# Patient Record
Sex: FEMALE | Race: WHITE | NOT HISPANIC OR LATINO | URBAN - METROPOLITAN AREA
[De-identification: names, ages, dates, MRNs, and addresses within clinical notes are randomized per-mention and may not be internally consistent; named-entity substitution may affect disease eponyms.]

---

## 2023-04-24 ENCOUNTER — TELEPHONE (OUTPATIENT)
Dept: PRIMARY CARE | Facility: CLINIC | Age: 38
End: 2023-04-24
Payer: COMMERCIAL

## 2023-04-24 NOTE — TELEPHONE ENCOUNTER
I was working a work que and came across this patient scheduled for a NPV with Asmita wanting a well women exam with breast and pap. You might want to call patient and advise her that Asmita does not do well women/pap and see if she still wants to est with her.

## 2023-04-25 PROBLEM — E78.01 FAMILIAL HYPERCHOLESTEROLEMIA: Status: ACTIVE | Noted: 2023-04-25

## 2023-04-25 PROBLEM — J45.20 MILD INTERMITTENT ASTHMA WITHOUT COMPLICATION (HHS-HCC): Status: ACTIVE | Noted: 2023-04-25

## 2023-04-25 PROBLEM — J30.9 ALLERGIC RHINITIS: Status: ACTIVE | Noted: 2023-04-25

## 2023-04-25 RX ORDER — LEVALBUTEROL TARTRATE 45 UG/1
AEROSOL, METERED ORAL
COMMUNITY
Start: 2015-03-25 | End: 2023-05-03 | Stop reason: SDUPTHER

## 2023-04-25 RX ORDER — FOLIC ACID/MULTIVIT,IRON,MINER 0.4MG-18MG
TABLET ORAL
COMMUNITY
Start: 2018-06-01 | End: 2023-04-27

## 2023-04-25 RX ORDER — CETIRIZINE HYDROCHLORIDE, PSEUDOEPHEDRINE HYDROCHLORIDE 5; 120 MG/1; MG/1
TABLET, FILM COATED, EXTENDED RELEASE ORAL
COMMUNITY
Start: 2015-03-25 | End: 2023-04-27

## 2023-05-02 PROBLEM — E66.01 MORBID OBESITY (MULTI): Status: ACTIVE | Noted: 2023-05-02

## 2023-05-02 PROBLEM — L92.3: Status: ACTIVE | Noted: 2023-05-02

## 2023-05-02 PROBLEM — J45.909 ASTHMA (HHS-HCC): Status: RESOLVED | Noted: 2023-05-02 | Resolved: 2023-05-02

## 2023-05-02 PROBLEM — E34.8 OTHER SPECIFIED ENDOCRINE DISORDERS: Status: RESOLVED | Noted: 2023-05-02 | Resolved: 2023-05-02

## 2023-05-02 PROBLEM — R71.8 ELEVATED RED BLOOD CELL COUNT: Status: RESOLVED | Noted: 2023-05-02 | Resolved: 2023-05-02

## 2023-05-02 PROBLEM — R06.00 DYSPNEA: Status: RESOLVED | Noted: 2023-05-02 | Resolved: 2023-05-02

## 2023-05-02 PROBLEM — R73.03 PREDIABETES: Status: RESOLVED | Noted: 2023-05-02 | Resolved: 2023-05-02

## 2023-05-02 PROBLEM — E88.810 METABOLIC SYNDROME: Status: ACTIVE | Noted: 2023-05-02

## 2023-05-02 PROBLEM — R06.00 DYSPNEA: Status: ACTIVE | Noted: 2023-05-02

## 2023-05-02 PROBLEM — R40.0 DAYTIME SOMNOLENCE: Status: RESOLVED | Noted: 2023-05-02 | Resolved: 2023-05-02

## 2023-05-02 PROBLEM — E34.8 OTHER SPECIFIED ENDOCRINE DISORDERS: Status: ACTIVE | Noted: 2023-05-02

## 2023-05-02 PROBLEM — E11.9 DIABETES MELLITUS WITHOUT COMPLICATION (MULTI): Status: RESOLVED | Noted: 2023-05-02 | Resolved: 2023-05-02

## 2023-05-02 PROBLEM — E88.810 METABOLIC SYNDROME: Status: RESOLVED | Noted: 2023-05-02 | Resolved: 2023-05-02

## 2023-05-02 PROBLEM — E66.01 MORBID OBESITY (MULTI): Status: RESOLVED | Noted: 2023-05-02 | Resolved: 2023-05-02

## 2023-05-02 PROBLEM — E28.2 POLYCYSTIC OVARY SYNDROME: Status: RESOLVED | Noted: 2023-05-02 | Resolved: 2023-05-02

## 2023-05-02 PROBLEM — J45.909 ASTHMA (HHS-HCC): Status: ACTIVE | Noted: 2023-05-02

## 2023-05-02 PROBLEM — K13.70 ORAL LESION: Status: RESOLVED | Noted: 2023-05-02 | Resolved: 2023-05-02

## 2023-05-02 PROBLEM — D58.2 OTHER HEMOGLOBINOPATHIES (CMS-HCC): Status: RESOLVED | Noted: 2023-05-02 | Resolved: 2023-05-02

## 2023-05-02 PROBLEM — J45.20 MILD INTERMITTENT ASTHMA WITHOUT COMPLICATION (HHS-HCC): Status: RESOLVED | Noted: 2023-04-25 | Resolved: 2023-05-02

## 2023-05-02 PROBLEM — J40: Status: ACTIVE | Noted: 2023-05-02

## 2023-05-02 PROBLEM — E63.9 NUTRITIONAL DEFICIENCY: Status: ACTIVE | Noted: 2023-05-02

## 2023-05-02 PROBLEM — R73.03 PREDIABETES: Status: ACTIVE | Noted: 2023-05-02

## 2023-05-02 PROBLEM — R06.83 HABITUAL SNORING: Status: ACTIVE | Noted: 2023-05-02

## 2023-05-02 PROBLEM — K22.9 ESOPHAGEAL DISORDER: Status: ACTIVE | Noted: 2023-05-02

## 2023-05-02 PROBLEM — E63.9 NUTRITIONAL DEFICIENCY: Status: RESOLVED | Noted: 2023-05-02 | Resolved: 2023-05-02

## 2023-05-02 PROBLEM — R40.0 DAYTIME SOMNOLENCE: Status: ACTIVE | Noted: 2023-05-02

## 2023-05-02 PROBLEM — J35.01 CHRONIC TONSILLITIS: Status: RESOLVED | Noted: 2023-05-02 | Resolved: 2023-05-02

## 2023-05-02 PROBLEM — E28.2 POLYCYSTIC OVARY SYNDROME: Status: ACTIVE | Noted: 2023-05-02

## 2023-05-02 PROBLEM — D58.2 OTHER HEMOGLOBINOPATHIES (CMS-HCC): Status: ACTIVE | Noted: 2023-05-02

## 2023-05-02 PROBLEM — K13.70 ORAL LESION: Status: ACTIVE | Noted: 2023-05-02

## 2023-05-02 PROBLEM — K22.9 ESOPHAGEAL DISORDER: Status: RESOLVED | Noted: 2023-05-02 | Resolved: 2023-05-02

## 2023-05-02 PROBLEM — E11.9 DIABETES MELLITUS WITHOUT COMPLICATION (MULTI): Status: ACTIVE | Noted: 2023-05-02

## 2023-05-02 PROBLEM — R71.8 ELEVATED RED BLOOD CELL COUNT: Status: ACTIVE | Noted: 2023-05-02

## 2023-05-02 PROBLEM — J35.01 CHRONIC TONSILLITIS: Status: ACTIVE | Noted: 2023-05-02

## 2023-05-02 PROBLEM — R06.83 HABITUAL SNORING: Status: RESOLVED | Noted: 2023-05-02 | Resolved: 2023-05-02

## 2023-05-02 PROBLEM — E78.01 FAMILIAL HYPERCHOLESTEROLEMIA: Status: RESOLVED | Noted: 2023-04-25 | Resolved: 2023-05-02

## 2023-05-03 ENCOUNTER — OFFICE VISIT (OUTPATIENT)
Dept: PRIMARY CARE | Facility: CLINIC | Age: 38
End: 2023-05-03
Payer: COMMERCIAL

## 2023-05-03 ENCOUNTER — LAB (OUTPATIENT)
Dept: LAB | Facility: LAB | Age: 38
End: 2023-05-03
Payer: COMMERCIAL

## 2023-05-03 VITALS
SYSTOLIC BLOOD PRESSURE: 106 MMHG | HEART RATE: 67 BPM | DIASTOLIC BLOOD PRESSURE: 72 MMHG | OXYGEN SATURATION: 99 % | WEIGHT: 156.6 LBS | HEIGHT: 68 IN | BODY MASS INDEX: 23.73 KG/M2

## 2023-05-03 DIAGNOSIS — E78.5 HYPERLIPIDEMIA, UNSPECIFIED HYPERLIPIDEMIA TYPE: ICD-10-CM

## 2023-05-03 DIAGNOSIS — J45.20 MILD INTERMITTENT ASTHMA, UNSPECIFIED WHETHER COMPLICATED (HHS-HCC): ICD-10-CM

## 2023-05-03 DIAGNOSIS — Z01.419 ENCOUNTER FOR WELL WOMAN EXAM WITH ROUTINE GYNECOLOGICAL EXAM: ICD-10-CM

## 2023-05-03 DIAGNOSIS — Z00.00 WELLNESS EXAMINATION: ICD-10-CM

## 2023-05-03 DIAGNOSIS — Z00.00 WELLNESS EXAMINATION: Primary | ICD-10-CM

## 2023-05-03 DIAGNOSIS — Z13.29 SCREENING FOR THYROID DISORDER: ICD-10-CM

## 2023-05-03 DIAGNOSIS — J30.9 ALLERGIC RHINITIS, UNSPECIFIED SEASONALITY, UNSPECIFIED TRIGGER: ICD-10-CM

## 2023-05-03 LAB
ALANINE AMINOTRANSFERASE (SGPT) (U/L) IN SER/PLAS: 10 U/L (ref 7–45)
ALBUMIN (G/DL) IN SER/PLAS: 4.1 G/DL (ref 3.4–5)
ALBUMIN (MG/L) IN URINE: <7 MG/L
ALBUMIN/CREATININE (UG/MG) IN URINE: NORMAL UG/MG CRT (ref 0–30)
ALKALINE PHOSPHATASE (U/L) IN SER/PLAS: 39 U/L (ref 33–110)
ANION GAP IN SER/PLAS: 12 MMOL/L (ref 10–20)
ASPARTATE AMINOTRANSFERASE (SGOT) (U/L) IN SER/PLAS: 15 U/L (ref 9–39)
BASOPHILS (10*3/UL) IN BLOOD BY AUTOMATED COUNT: 0.07 X10E9/L (ref 0–0.1)
BASOPHILS/100 LEUKOCYTES IN BLOOD BY AUTOMATED COUNT: 1.6 % (ref 0–2)
BILIRUBIN TOTAL (MG/DL) IN SER/PLAS: 0.4 MG/DL (ref 0–1.2)
CALCIUM (MG/DL) IN SER/PLAS: 9 MG/DL (ref 8.6–10.6)
CARBON DIOXIDE, TOTAL (MMOL/L) IN SER/PLAS: 28 MMOL/L (ref 21–32)
CHLORIDE (MMOL/L) IN SER/PLAS: 103 MMOL/L (ref 98–107)
CHOLESTEROL (MG/DL) IN SER/PLAS: 266 MG/DL (ref 0–199)
CHOLESTEROL IN HDL (MG/DL) IN SER/PLAS: 83.4 MG/DL
CHOLESTEROL/HDL RATIO: 3.2
CREATININE (MG/DL) IN SER/PLAS: 0.92 MG/DL (ref 0.5–1.05)
CREATININE (MG/DL) IN URINE: 27.3 MG/DL (ref 20–320)
EOSINOPHILS (10*3/UL) IN BLOOD BY AUTOMATED COUNT: 0.34 X10E9/L (ref 0–0.7)
EOSINOPHILS/100 LEUKOCYTES IN BLOOD BY AUTOMATED COUNT: 7.9 % (ref 0–6)
ERYTHROCYTE DISTRIBUTION WIDTH (RATIO) BY AUTOMATED COUNT: 12.4 % (ref 11.5–14.5)
ERYTHROCYTE MEAN CORPUSCULAR HEMOGLOBIN CONCENTRATION (G/DL) BY AUTOMATED: 32.5 G/DL (ref 32–36)
ERYTHROCYTE MEAN CORPUSCULAR VOLUME (FL) BY AUTOMATED COUNT: 94 FL (ref 80–100)
ERYTHROCYTES (10*6/UL) IN BLOOD BY AUTOMATED COUNT: 4.29 X10E12/L (ref 4–5.2)
GFR FEMALE: 83 ML/MIN/1.73M2
GLUCOSE (MG/DL) IN SER/PLAS: 91 MG/DL (ref 74–99)
HEMATOCRIT (%) IN BLOOD BY AUTOMATED COUNT: 40.3 % (ref 36–46)
HEMOGLOBIN (G/DL) IN BLOOD: 13.1 G/DL (ref 12–16)
IMMATURE GRANULOCYTES/100 LEUKOCYTES IN BLOOD BY AUTOMATED COUNT: 0 % (ref 0–0.9)
LDL: 168 MG/DL (ref 0–99)
LEUKOCYTES (10*3/UL) IN BLOOD BY AUTOMATED COUNT: 4.3 X10E9/L (ref 4.4–11.3)
LYMPHOCYTES (10*3/UL) IN BLOOD BY AUTOMATED COUNT: 1.38 X10E9/L (ref 1.2–4.8)
LYMPHOCYTES/100 LEUKOCYTES IN BLOOD BY AUTOMATED COUNT: 32.2 % (ref 13–44)
MONOCYTES (10*3/UL) IN BLOOD BY AUTOMATED COUNT: 0.24 X10E9/L (ref 0.1–1)
MONOCYTES/100 LEUKOCYTES IN BLOOD BY AUTOMATED COUNT: 5.6 % (ref 2–10)
NEUTROPHILS (10*3/UL) IN BLOOD BY AUTOMATED COUNT: 2.26 X10E9/L (ref 1.2–7.7)
NEUTROPHILS/100 LEUKOCYTES IN BLOOD BY AUTOMATED COUNT: 52.7 % (ref 40–80)
NRBC (PER 100 WBCS) BY AUTOMATED COUNT: 0 /100 WBC (ref 0–0)
PLATELETS (10*3/UL) IN BLOOD AUTOMATED COUNT: 238 X10E9/L (ref 150–450)
POTASSIUM (MMOL/L) IN SER/PLAS: 4.1 MMOL/L (ref 3.5–5.3)
PROTEIN TOTAL: 6.8 G/DL (ref 6.4–8.2)
SODIUM (MMOL/L) IN SER/PLAS: 139 MMOL/L (ref 136–145)
THYROTROPIN (MIU/L) IN SER/PLAS BY DETECTION LIMIT <= 0.05 MIU/L: 0.97 MIU/L (ref 0.44–3.98)
TRIGLYCERIDE (MG/DL) IN SER/PLAS: 75 MG/DL (ref 0–149)
UREA NITROGEN (MG/DL) IN SER/PLAS: 11 MG/DL (ref 6–23)
VLDL: 15 MG/DL (ref 0–40)

## 2023-05-03 PROCEDURE — 99395 PREV VISIT EST AGE 18-39: CPT | Performed by: NURSE PRACTITIONER

## 2023-05-03 PROCEDURE — 36415 COLL VENOUS BLD VENIPUNCTURE: CPT

## 2023-05-03 PROCEDURE — 85025 COMPLETE CBC W/AUTO DIFF WBC: CPT

## 2023-05-03 PROCEDURE — 84443 ASSAY THYROID STIM HORMONE: CPT

## 2023-05-03 PROCEDURE — 80053 COMPREHEN METABOLIC PANEL: CPT

## 2023-05-03 PROCEDURE — 82570 ASSAY OF URINE CREATININE: CPT

## 2023-05-03 PROCEDURE — 80061 LIPID PANEL: CPT

## 2023-05-03 PROCEDURE — 82043 UR ALBUMIN QUANTITATIVE: CPT

## 2023-05-03 PROCEDURE — 1036F TOBACCO NON-USER: CPT | Performed by: NURSE PRACTITIONER

## 2023-05-03 RX ORDER — LEVALBUTEROL TARTRATE 45 UG/1
2 AEROSOL, METERED ORAL EVERY 4 HOURS PRN
Qty: 15 G | Refills: 11 | Status: SHIPPED | OUTPATIENT
Start: 2023-05-03 | End: 2024-05-01 | Stop reason: SDUPTHER

## 2023-05-03 RX ORDER — CETIRIZINE HYDROCHLORIDE 5 MG/1
5 TABLET ORAL DAILY
COMMUNITY

## 2023-05-03 ASSESSMENT — ENCOUNTER SYMPTOMS
SLEEP DISTURBANCE: 0
NEUROLOGICAL NEGATIVE: 1
HEMATOLOGIC/LYMPHATIC NEGATIVE: 1
MUSCULOSKELETAL NEGATIVE: 1
CARDIOVASCULAR NEGATIVE: 1
CONSTITUTIONAL NEGATIVE: 1
GASTROINTESTINAL NEGATIVE: 1

## 2023-05-03 NOTE — PROGRESS NOTES
"Subjective   Patient ID: Sulma Bains is a 38 y.o. female who presents for Establish Care (New pt presents to get established care) and Annual Exam (Yearly well exam ).    HPI   Patient here to establish with provider with physical examination. Previous provder was Dr. Fernanda Allison, 06/01/2018 [facility].  Works as NP at [employer]. Has two children (4 & 6) Runs and yoga for exercise. Typically needs inhaler with exercise. Had been using Niacin to help control lipids, but currently not using routinely   Pertinent Medical History: Two maternal aunts breast cancer (age 50 & 60) Mom did not get BRCA testing- has had normal mammograms.   Had EKG and echocardiogram completed while in college (Had panic attack) found to be normal. Declined EKG today with exam.     Current concerns: None  Chronic Concerns: Allergic Rhinitis, Asthma, DM,   Specialist:  None  Labs- most recent in EMR 2015.     Review of Systems   Constitutional: Negative.    HENT: Negative.          DDS every six months.    Eyes:         Eye exam- not yearly    Respiratory:          Inhaler once weekly.   Cardiovascular: Negative.    Gastrointestinal: Negative.    Genitourinary: Negative.         LMP 28 day - 2 weeks, duration 7 days, denies cramping or excessive bleeding.   Concerned about her right upper breast- since breast feeding youngest child believes to have changes in tissue- has been consistent, tender with pre menses- does breast exams routinely.      Musculoskeletal: Negative.    Skin: Negative.         Sunscreen user.   Allergic/Immunologic: Positive for environmental allergies.   Neurological: Negative.    Hematological: Negative.    Psychiatric/Behavioral:  Negative for sleep disturbance.        Objective   /72 (BP Location: Right arm, Patient Position: Sitting, BP Cuff Size: Adult)   Pulse 67   Ht 1.727 m (5' 8\")   Wt 71 kg (156 lb 9.6 oz)   SpO2 99%   BMI 23.81 kg/m²     Physical Exam  Vitals reviewed. "   Constitutional:       Appearance: Normal appearance.   HENT:      Right Ear: Tympanic membrane, ear canal and external ear normal.      Left Ear: Tympanic membrane, ear canal and external ear normal.      Nose: Nose normal.      Mouth/Throat:      Lips: Pink.      Mouth: Mucous membranes are moist.      Pharynx: Oropharynx is clear.   Eyes:      General: Lids are normal.      Extraocular Movements: Extraocular movements intact.      Conjunctiva/sclera: Conjunctivae normal.      Pupils: Pupils are equal, round, and reactive to light.   Neck:      Thyroid: No thyromegaly.   Cardiovascular:      Rate and Rhythm: Normal rate. Rhythm irregular.      Pulses: Normal pulses.   Pulmonary:      Effort: Pulmonary effort is normal.      Breath sounds: Normal breath sounds.   Chest:   Breasts:     Breasts are symmetrical.      Right: No tenderness.      Left: No tenderness.      Comments: Bilateral fibroglandular consistency throughout superior quadrants of breasts. More dense consistency within left breast compared to right breast, no abnormal masses or nodules palpation.    Abdominal:      General: Abdomen is flat. Bowel sounds are normal.      Palpations: Abdomen is soft.   Musculoskeletal:         General: Normal range of motion.      Cervical back: Full passive range of motion without pain, normal range of motion and neck supple.   Lymphadenopathy:      Cervical: No cervical adenopathy.      Upper Body:      Right upper body: No supraclavicular or axillary adenopathy.      Left upper body: No supraclavicular or axillary adenopathy.   Skin:     General: Skin is warm.   Neurological:      General: No focal deficit present.      Mental Status: She is alert.      Cranial Nerves: No cranial nerve deficit.   Psychiatric:         Mood and Affect: Mood normal.         Behavior: Behavior normal.         Judgment: Judgment normal.       Assessment/Plan   Health Maintenance  Labs- orders provided 05/03/2023  Influenza-  annually  Prevnar 13/20- not indicated   Shingrix- not indicated   Colonoscopy- no family history  Cervical Cancer screen - referral to GYN provided   Mammogram- not indicated, breast exam today unremarkable.   DEXA BONE Density - not indicated.   NON SMOKER    Diagnoses and all orders for this visit:  Wellness examination  -     CBC and Auto Differential; Future  -     Comprehensive Metabolic Panel; Future  -     Albumin , Urine Random; Future  Mild intermittent asthma, unspecified whether complicated  -     levalbuterol (Xopenex) 45 mcg/actuation inhaler; Inhale 2 puffs every 4 hours if needed for wheezing or shortness of breath.  Allergic rhinitis, unspecified seasonality, unspecified trigger- uses over the counter Cetirizine   Hyperlipidemia, unspecified hyperlipidemia type- 2015 lipid panel most recent in EMR- Total cholesterol 235, Triglycerides 121, HDL 96,   - had used Niacin previously (discontinued for breastfeeding )  -     Lipid Panel; Future  Screening for thyroid disorder  -     TSH with reflex to Free T4 if abnormal; Future  Encounter for well woman exam with routine gynecological exam- provided names to [facility] raegan & [facility] group.    -     Referral to Gynecology; Future     FOLLOW UP/ PLAN- yearly for physical and refills inhaler.   - labs-> communicate results

## 2023-05-03 NOTE — PATIENT INSTRUCTIONS
Goal of moderate exercise 150 minutes a week  Fasting labs, drink water-> will communicate results My Chart

## 2023-07-21 ENCOUNTER — LAB (OUTPATIENT)
Dept: LAB | Facility: LAB | Age: 38
End: 2023-07-21
Payer: COMMERCIAL

## 2023-07-21 LAB — TOBACCO SCREEN, URINE: NEGATIVE

## 2024-02-21 ENCOUNTER — OFFICE VISIT (OUTPATIENT)
Dept: OBSTETRICS AND GYNECOLOGY | Facility: CLINIC | Age: 39
End: 2024-02-21
Payer: COMMERCIAL

## 2024-02-21 VITALS
BODY MASS INDEX: 22.13 KG/M2 | DIASTOLIC BLOOD PRESSURE: 60 MMHG | WEIGHT: 146 LBS | HEIGHT: 68 IN | SYSTOLIC BLOOD PRESSURE: 104 MMHG

## 2024-02-21 DIAGNOSIS — Z80.3 FAMILY HISTORY OF BREAST CANCER: ICD-10-CM

## 2024-02-21 DIAGNOSIS — Z12.31 SCREENING MAMMOGRAM FOR BREAST CANCER: Primary | ICD-10-CM

## 2024-02-21 DIAGNOSIS — Z01.419 WOMEN'S ANNUAL ROUTINE GYNECOLOGICAL EXAMINATION: ICD-10-CM

## 2024-02-21 DIAGNOSIS — Z11.51 SCREENING FOR HUMAN PAPILLOMAVIRUS (HPV): ICD-10-CM

## 2024-02-21 DIAGNOSIS — Z12.4 PAP SMEAR FOR CERVICAL CANCER SCREENING: ICD-10-CM

## 2024-02-21 PROCEDURE — 1036F TOBACCO NON-USER: CPT | Performed by: NURSE PRACTITIONER

## 2024-02-21 PROCEDURE — 88175 CYTOPATH C/V AUTO FLUID REDO: CPT

## 2024-02-21 PROCEDURE — 99385 PREV VISIT NEW AGE 18-39: CPT | Performed by: NURSE PRACTITIONER

## 2024-02-21 PROCEDURE — 87624 HPV HI-RISK TYP POOLED RSLT: CPT

## 2024-02-21 ASSESSMENT — ENCOUNTER SYMPTOMS
MUSCULOSKELETAL NEGATIVE: 1
GASTROINTESTINAL NEGATIVE: 1
EYES NEGATIVE: 1
CARDIOVASCULAR NEGATIVE: 1
ENDOCRINE NEGATIVE: 1
RESPIRATORY NEGATIVE: 1
PSYCHIATRIC NEGATIVE: 1
NEUROLOGICAL NEGATIVE: 1
CONSTITUTIONAL NEGATIVE: 1

## 2024-02-21 NOTE — PROGRESS NOTES
Subjective   Patient ID: Sulma Bains is a 38 y.o. female who presents for New Patient Visit (States last pap smear was done at [facility] two years ago, no history of abnormal. ).  38 year old here for annual exam without complaints.  Her last pap was normal in 2022 at her last ob/gyn.  She states her periods are once a month and without concern.  She denies any history of abnormal paps.  She has a family history of breast cancer in her maternal great aunts x 3 and states her mom had genetic counseling appt and determined testing was not needed.  Her paternal grandmother passed from ovarian cancer in her 40s.          Review of Systems   Constitutional: Negative.    HENT: Negative.     Eyes: Negative.    Respiratory: Negative.     Cardiovascular: Negative.    Gastrointestinal: Negative.    Endocrine: Negative.    Genitourinary: Negative.    Musculoskeletal: Negative.    Skin: Negative.    Neurological: Negative.    Psychiatric/Behavioral: Negative.         Objective   Physical Exam  Vitals reviewed.   Constitutional:       Appearance: Normal appearance. She is well-developed.   Pulmonary:      Effort: Pulmonary effort is normal. No respiratory distress.   Chest:   Breasts:     Breasts are symmetrical.      Right: Normal. No swelling, bleeding, inverted nipple, mass, nipple discharge, skin change or tenderness.      Left: Normal. No swelling, bleeding, inverted nipple, mass, nipple discharge, skin change or tenderness.   Abdominal:      Palpations: Abdomen is soft.   Genitourinary:     General: Normal vulva.      Exam position: Lithotomy position.      Pubic Area: No rash.       Labia:         Right: No rash, tenderness, lesion or injury.         Left: No rash, tenderness, lesion or injury.       Urethra: No prolapse, urethral pain, urethral swelling or urethral lesion.      Vagina: Normal.      Cervix: Normal.      Uterus: Normal.       Adnexa: Right adnexa normal and left adnexa normal.      Rectum: Normal.       Comments: Pap obtained  Musculoskeletal:         General: Normal range of motion.   Lymphadenopathy:      Upper Body:      Right upper body: No supraclavicular, axillary or pectoral adenopathy.      Left upper body: No supraclavicular, axillary or pectoral adenopathy.   Skin:     General: Skin is warm and dry.   Neurological:      General: No focal deficit present.      Mental Status: She is alert and oriented to person, place, and time. Mental status is at baseline.   Psychiatric:         Attention and Perception: Attention and perception normal.         Mood and Affect: Mood and affect normal.         Speech: Speech normal.         Behavior: Behavior normal. Behavior is cooperative.         Thought Content: Thought content normal.         Judgment: Judgment normal.     Exam performed by Elizabeth NAVAS, 2nd exam performed by student Anel BRANDT student [facility] upon patient agreement.      Assessment/Plan   Problem List Items Addressed This Visit             ICD-10-CM    Women's annual routine gynecological examination Z01.419     Other Visit Diagnoses         Codes    Screening mammogram for breast cancer    -  Primary Z12.31    Relevant Orders    BI mammo bilateral screening tomosynthesis    Family history of breast cancer     Z80.3    Relevant Orders    BI mammo bilateral screening tomosynthesis    Pap smear for cervical cancer screening     Z12.4    Relevant Orders    THINPREP PAP TEST    Screening for human papillomavirus (HPV)     Z11.51    Relevant Orders    THINPREP PAP TEST          Pap/hpv sent  Mammogram ordered  Reviewed option of Myrisk testing, patient considering  Follow up 1 year or as needed        STACY Guardado-CNP 02/21/24 3:47 PM

## 2024-04-25 ENCOUNTER — HOSPITAL ENCOUNTER (OUTPATIENT)
Dept: RADIOLOGY | Facility: CLINIC | Age: 39
Discharge: HOME | End: 2024-04-25
Payer: COMMERCIAL

## 2024-04-25 VITALS — HEIGHT: 68 IN | BODY MASS INDEX: 22.12 KG/M2 | WEIGHT: 145.94 LBS

## 2024-04-25 DIAGNOSIS — Z12.31 SCREENING MAMMOGRAM FOR BREAST CANCER: ICD-10-CM

## 2024-04-25 DIAGNOSIS — Z80.3 FAMILY HISTORY OF BREAST CANCER: ICD-10-CM

## 2024-04-25 PROCEDURE — 77063 BREAST TOMOSYNTHESIS BI: CPT | Performed by: RADIOLOGY

## 2024-04-25 PROCEDURE — 77067 SCR MAMMO BI INCL CAD: CPT | Performed by: RADIOLOGY

## 2024-04-25 PROCEDURE — 77067 SCR MAMMO BI INCL CAD: CPT

## 2024-05-01 ENCOUNTER — LAB (OUTPATIENT)
Dept: LAB | Facility: LAB | Age: 39
End: 2024-05-01
Payer: COMMERCIAL

## 2024-05-01 ENCOUNTER — OFFICE VISIT (OUTPATIENT)
Dept: PRIMARY CARE | Facility: CLINIC | Age: 39
End: 2024-05-01
Payer: COMMERCIAL

## 2024-05-01 VITALS
OXYGEN SATURATION: 99 % | SYSTOLIC BLOOD PRESSURE: 102 MMHG | BODY MASS INDEX: 22.67 KG/M2 | HEART RATE: 68 BPM | WEIGHT: 149.6 LBS | HEIGHT: 68 IN | DIASTOLIC BLOOD PRESSURE: 70 MMHG

## 2024-05-01 DIAGNOSIS — E78.5 HYPERLIPIDEMIA, UNSPECIFIED HYPERLIPIDEMIA TYPE: ICD-10-CM

## 2024-05-01 DIAGNOSIS — Z13.29 SCREENING FOR THYROID DISORDER: ICD-10-CM

## 2024-05-01 DIAGNOSIS — Z00.00 WELLNESS EXAMINATION: ICD-10-CM

## 2024-05-01 DIAGNOSIS — Z00.00 WELLNESS EXAMINATION: Primary | ICD-10-CM

## 2024-05-01 DIAGNOSIS — J30.9 ALLERGIC RHINITIS, UNSPECIFIED SEASONALITY, UNSPECIFIED TRIGGER: ICD-10-CM

## 2024-05-01 DIAGNOSIS — J45.20 MILD INTERMITTENT ASTHMA, UNSPECIFIED WHETHER COMPLICATED (HHS-HCC): ICD-10-CM

## 2024-05-01 PROBLEM — L92.3 FOREIGN BODY GRANULOMA OF SKIN AND SUBCUTANEOUS TISSUE: Status: RESOLVED | Noted: 2023-05-02 | Resolved: 2024-05-01

## 2024-05-01 LAB
ALBUMIN SERPL BCP-MCNC: 4.2 G/DL (ref 3.4–5)
ALP SERPL-CCNC: 47 U/L (ref 33–110)
ALT SERPL W P-5'-P-CCNC: 10 U/L (ref 7–45)
ANION GAP SERPL CALC-SCNC: 13 MMOL/L (ref 10–20)
AST SERPL W P-5'-P-CCNC: 17 U/L (ref 9–39)
BASOPHILS # BLD AUTO: 0.05 X10*3/UL (ref 0–0.1)
BASOPHILS NFR BLD AUTO: 1.1 %
BILIRUB SERPL-MCNC: 0.5 MG/DL (ref 0–1.2)
BUN SERPL-MCNC: 11 MG/DL (ref 6–23)
CALCIUM SERPL-MCNC: 9.4 MG/DL (ref 8.6–10.6)
CHLORIDE SERPL-SCNC: 103 MMOL/L (ref 98–107)
CHOLEST SERPL-MCNC: 249 MG/DL (ref 0–199)
CHOLESTEROL/HDL RATIO: 2.9
CO2 SERPL-SCNC: 27 MMOL/L (ref 21–32)
CREAT SERPL-MCNC: 0.81 MG/DL (ref 0.5–1.05)
EGFRCR SERPLBLD CKD-EPI 2021: >90 ML/MIN/1.73M*2
EOSINOPHIL # BLD AUTO: 0.26 X10*3/UL (ref 0–0.7)
EOSINOPHIL NFR BLD AUTO: 5.6 %
ERYTHROCYTE [DISTWIDTH] IN BLOOD BY AUTOMATED COUNT: 12.8 % (ref 11.5–14.5)
GLUCOSE SERPL-MCNC: 95 MG/DL (ref 74–99)
HCT VFR BLD AUTO: 41.1 % (ref 36–46)
HDLC SERPL-MCNC: 86.5 MG/DL
HGB BLD-MCNC: 13.1 G/DL (ref 12–16)
IMM GRANULOCYTES # BLD AUTO: 0.01 X10*3/UL (ref 0–0.7)
IMM GRANULOCYTES NFR BLD AUTO: 0.2 % (ref 0–0.9)
LDLC SERPL CALC-MCNC: 151 MG/DL
LYMPHOCYTES # BLD AUTO: 1.29 X10*3/UL (ref 1.2–4.8)
LYMPHOCYTES NFR BLD AUTO: 27.9 %
MCH RBC QN AUTO: 29.6 PG (ref 26–34)
MCHC RBC AUTO-ENTMCNC: 31.9 G/DL (ref 32–36)
MCV RBC AUTO: 93 FL (ref 80–100)
MONOCYTES # BLD AUTO: 0.23 X10*3/UL (ref 0.1–1)
MONOCYTES NFR BLD AUTO: 5 %
NEUTROPHILS # BLD AUTO: 2.78 X10*3/UL (ref 1.2–7.7)
NEUTROPHILS NFR BLD AUTO: 60.2 %
NON HDL CHOLESTEROL: 163 MG/DL (ref 0–149)
NRBC BLD-RTO: 0 /100 WBCS (ref 0–0)
PLATELET # BLD AUTO: 236 X10*3/UL (ref 150–450)
POTASSIUM SERPL-SCNC: 4.8 MMOL/L (ref 3.5–5.3)
PROT SERPL-MCNC: 6.9 G/DL (ref 6.4–8.2)
RBC # BLD AUTO: 4.42 X10*6/UL (ref 4–5.2)
SODIUM SERPL-SCNC: 138 MMOL/L (ref 136–145)
TRIGL SERPL-MCNC: 57 MG/DL (ref 0–149)
TSH SERPL-ACNC: 1.08 MIU/L (ref 0.44–3.98)
VLDL: 11 MG/DL (ref 0–40)
WBC # BLD AUTO: 4.6 X10*3/UL (ref 4.4–11.3)

## 2024-05-01 PROCEDURE — 1036F TOBACCO NON-USER: CPT | Performed by: NURSE PRACTITIONER

## 2024-05-01 PROCEDURE — 36415 COLL VENOUS BLD VENIPUNCTURE: CPT

## 2024-05-01 PROCEDURE — 80061 LIPID PANEL: CPT

## 2024-05-01 PROCEDURE — 85025 COMPLETE CBC W/AUTO DIFF WBC: CPT

## 2024-05-01 PROCEDURE — 80053 COMPREHEN METABOLIC PANEL: CPT

## 2024-05-01 PROCEDURE — 82570 ASSAY OF URINE CREATININE: CPT

## 2024-05-01 PROCEDURE — 84443 ASSAY THYROID STIM HORMONE: CPT

## 2024-05-01 PROCEDURE — 82043 UR ALBUMIN QUANTITATIVE: CPT

## 2024-05-01 PROCEDURE — 99395 PREV VISIT EST AGE 18-39: CPT | Performed by: NURSE PRACTITIONER

## 2024-05-01 RX ORDER — FLUTICASONE PROPIONATE 50 MCG
1 SPRAY, SUSPENSION (ML) NASAL DAILY
Qty: 16 G | Refills: 11 | Status: SHIPPED | OUTPATIENT
Start: 2024-05-01 | End: 2025-05-01

## 2024-05-01 RX ORDER — LEVALBUTEROL TARTRATE 45 UG/1
2 AEROSOL, METERED ORAL EVERY 4 HOURS PRN
Qty: 15 G | Refills: 11 | Status: SHIPPED | OUTPATIENT
Start: 2024-05-01

## 2024-05-01 RX ORDER — CLINDAMYCIN PHOSPHATE 10 UG/ML
LOTION TOPICAL 2 TIMES DAILY
COMMUNITY
Start: 2024-02-28

## 2024-05-01 ASSESSMENT — ENCOUNTER SYMPTOMS
RHINORRHEA: 1
NEUROLOGICAL NEGATIVE: 1
HEMATOLOGIC/LYMPHATIC NEGATIVE: 1
CARDIOVASCULAR NEGATIVE: 1
CONSTITUTIONAL NEGATIVE: 1
RESPIRATORY NEGATIVE: 1
PSYCHIATRIC NEGATIVE: 1
SLEEP DISTURBANCE: 0
GASTROINTESTINAL NEGATIVE: 1
EYES NEGATIVE: 1

## 2024-05-01 ASSESSMENT — PAIN SCALES - GENERAL: PAINLEVEL: 0-NO PAIN

## 2024-05-01 NOTE — PROGRESS NOTES
"Subjective   Patient ID: Sulma Bains is a 39 y.o. female who presents for Annual Exam (Yearly physical /Lov 5/3/23/Labs 5/3/23).    HPI   Patient here for wellness exam. Last office visit on 05/03/2023.  Had been using Niacin previously to help control lipids- although reports not consistently.  Reports had first episode of kidney stones after following Keto diet within the last few weeks,     Current concerns:  NONE   Chronic Concerns: Allergic Rhinitis, Asthma, DM, Hyperlipidemia  Specialist:    - Dermatologist- skin checks ([facility])   - GYN [facility]   Labs-  05/03/2023   NON SMOKER  ETOH- 2-3 glasses of wine on weekends, Reports family hx of alcoholism so watches her intake.   Exercise- Runs & Yoga  Diet- low fat typically   No sweetened drinks    Review of Systems   Constitutional: Negative.    HENT:  Positive for rhinorrhea.         Eye exam not recently    Eyes: Negative.    Respiratory: Negative.          Using inhaler once a week.    Cardiovascular: Negative.    Gastrointestinal: Negative.    Genitourinary:         Kidney stone approximately one week ago  LMP 04/18/2024- 28 days remains un-concerning.  Off of OCA since 32 yo.      Musculoskeletal:         Knee pain with running 2-3 miles once or twice a week- unable to keep that up   Skin: Negative.    Allergic/Immunologic: Positive for environmental allergies.   Neurological: Negative.    Hematological: Negative.    Psychiatric/Behavioral: Negative.  Negative for sleep disturbance.      Objective   /70 (BP Location: Left arm, Patient Position: Sitting, BP Cuff Size: Adult)   Pulse 68   Ht 1.727 m (5' 7.99\")   Wt 67.9 kg (149 lb 9.6 oz)   SpO2 99%   BMI 22.75 kg/m²   Weight last appt (May 2023) 156.9 lbs   Physical Exam  Vitals reviewed.   Constitutional:       Appearance: She is normal weight.   HENT:      Right Ear: Tympanic membrane and ear canal normal.      Left Ear: Tympanic membrane and ear canal normal.      Nose: " Nose normal.      Mouth/Throat:      Lips: Pink.      Mouth: Mucous membranes are moist.      Pharynx: Oropharynx is clear.   Eyes:      General: Lids are normal.      Extraocular Movements: Extraocular movements intact.      Conjunctiva/sclera: Conjunctivae normal.   Neck:      Thyroid: No thyromegaly.      Vascular: No carotid bruit.   Cardiovascular:      Rate and Rhythm: Normal rate and regular rhythm.      Pulses:           Dorsalis pedis pulses are 2+ on the right side and 2+ on the left side.      Heart sounds: Normal heart sounds.   Pulmonary:      Effort: Pulmonary effort is normal.      Breath sounds: Normal breath sounds. No decreased breath sounds, wheezing or rhonchi.   Abdominal:      General: Bowel sounds are normal.      Palpations: Abdomen is soft.      Tenderness: There is no abdominal tenderness.   Musculoskeletal:      Cervical back: Normal range of motion and neck supple.      Right lower leg: No edema.      Left lower leg: No edema.   Lymphadenopathy:      Cervical: No cervical adenopathy.   Skin:     General: Skin is warm.      Findings: No rash.   Neurological:      Mental Status: She is alert.      Cranial Nerves: Cranial nerves 2-12 are intact.      Coordination: Coordination is intact.      Gait: Gait is intact.      Deep Tendon Reflexes:      Reflex Scores:       Bicep reflexes are 2+ on the right side and 2+ on the left side.       Patellar reflexes are 2+ on the right side and 2+ on the left side.  Psychiatric:         Attention and Perception: Attention normal.         Mood and Affect: Mood normal.         Speech: Speech normal.         Behavior: Behavior normal. Behavior is cooperative.         Thought Content: Thought content normal.         Cognition and Memory: Cognition normal.         Judgment: Judgment normal.     999  Assessment/Plan   Labs- 05/03/2023  Influenza- annually  Prevnar 13/20- not indicated   Shingrix- not indicated   Colonoscopy- no family history- recommended  screening age 45.   Cervical Cancer screen - GYN    Mammogram- .02/21/2024- (family hx breast cancer)   DEXA BONE Density - not indicated.   Had EKG and echocardiogram completed while in college (Had panic attack) found to be normal     Diagnoses and all orders for this visit:  Wellness examination: reviewed screenings, immunizations and vital signs. Reviewed appropriate health screenings/practices: including regular DDS & eye exams, wearing sunscreen,  appropriate balanced diet, such as the Mediterranean diet or low fat heart healthy diet,  exercise - moderate activity of at least 150 minutes a week, obtain and maintain normal Body Mass Index.      -     CBC and Auto Differential; Future  -     Comprehensive Metabolic Panel; Future  -     Albumin , Urine Random; Future  Screening for thyroid disorder  -     TSH with reflex to Free T4 if abnormal; Future  Hyperlipidemia, unspecified hyperlipidemia type  -     Lipid Panel; Future-Pending results will repeat in six months (orders will need placed) Sulma plans to start Niacin, fish oil consistently.   Mild intermittent asthma, unspecified whether complicated ([facility]) / Allergic rhinitis, unspecified seasonality, unspecified trigger  -  Refilled   levalbuterol (Xopenex) 45 mcg/actuation inhaler; Inhale 2 puffs every 4 hours if needed for wheezing or shortness of breath.  -  Initiated  fluticasone (Flonase) 50 mcg/actuation nasal spray; Administer 1 spray into each nostril once daily. Shake gently. Before first use, prime pump. After use, clean tip and replace cap.    PLAN: Follow up yearly for wellness  Labs-> communicate through MY CHART, repeat lipids in six months

## 2024-05-02 DIAGNOSIS — E78.5 HYPERLIPIDEMIA, UNSPECIFIED HYPERLIPIDEMIA TYPE: Primary | ICD-10-CM

## 2024-05-02 LAB
CREAT UR-MCNC: 43.7 MG/DL (ref 20–320)
MICROALBUMIN UR-MCNC: <7 MG/L
MICROALBUMIN/CREAT UR: NORMAL MG/G{CREAT}

## 2024-05-08 ENCOUNTER — APPOINTMENT (OUTPATIENT)
Dept: PRIMARY CARE | Facility: CLINIC | Age: 39
End: 2024-05-08
Payer: COMMERCIAL

## 2025-02-26 ENCOUNTER — APPOINTMENT (OUTPATIENT)
Dept: OBSTETRICS AND GYNECOLOGY | Facility: CLINIC | Age: 40
End: 2025-02-26
Payer: COMMERCIAL

## 2025-02-26 VITALS
BODY MASS INDEX: 23.49 KG/M2 | WEIGHT: 155 LBS | SYSTOLIC BLOOD PRESSURE: 100 MMHG | HEIGHT: 68 IN | DIASTOLIC BLOOD PRESSURE: 72 MMHG

## 2025-02-26 DIAGNOSIS — Z80.3 FAMILY HISTORY OF BREAST CANCER: ICD-10-CM

## 2025-02-26 DIAGNOSIS — Z12.31 SCREENING MAMMOGRAM FOR BREAST CANCER: ICD-10-CM

## 2025-02-26 DIAGNOSIS — Z01.419 WOMEN'S ANNUAL ROUTINE GYNECOLOGICAL EXAMINATION: Primary | ICD-10-CM

## 2025-02-26 PROCEDURE — 99395 PREV VISIT EST AGE 18-39: CPT | Performed by: NURSE PRACTITIONER

## 2025-02-26 PROCEDURE — 3008F BODY MASS INDEX DOCD: CPT | Performed by: NURSE PRACTITIONER

## 2025-02-26 PROCEDURE — 1036F TOBACCO NON-USER: CPT | Performed by: NURSE PRACTITIONER

## 2025-02-26 ASSESSMENT — ENCOUNTER SYMPTOMS
GASTROINTESTINAL NEGATIVE: 1
RESPIRATORY NEGATIVE: 1
CONSTITUTIONAL NEGATIVE: 1
CARDIOVASCULAR NEGATIVE: 1
EYES NEGATIVE: 1
NEUROLOGICAL NEGATIVE: 1
ENDOCRINE NEGATIVE: 1
MUSCULOSKELETAL NEGATIVE: 1
PSYCHIATRIC NEGATIVE: 1

## 2025-02-26 NOTE — PROGRESS NOTES
Subjective   Patient ID: Sulma Bains is a 39 y.o. female who presents for Annual Exam (Normal PAP / HPV 2/21/2024).  39 year old here for annual exam with complaints of having slight pain on her left side with menses and bleeding with intercourse.  She notes that she has the left sided pain prior to her period and previously it used to rotate between the left and right, but now it is only on the left.  She denies any bowel changes with the pain.  She also notes the pain does not occur between cycles.  She has noticed spotting after intercourse for 24 hours.  The spotting starts off bright red then changes to brown.  She denies any pain, urinary changes, discharge with the spotting.  She had a normal pap/hpv in 2024.  She denies any new partners.  She has a family history of breast cancer in two maternal aunts.  She is due for her mammogram in April 2025, her last one was normal in apil 2024.        Review of Systems   Constitutional: Negative.    HENT: Negative.     Eyes: Negative.    Respiratory: Negative.     Cardiovascular: Negative.    Gastrointestinal: Negative.    Endocrine: Negative.    Genitourinary:  Positive for vaginal bleeding.   Musculoskeletal: Negative.    Skin: Negative.    Neurological: Negative.    Psychiatric/Behavioral: Negative.         Objective   Physical Exam  Vitals reviewed.   Constitutional:       Appearance: Normal appearance. She is well-developed.   Pulmonary:      Effort: Pulmonary effort is normal. No respiratory distress.   Chest:   Breasts:     Breasts are symmetrical.      Right: Normal. No swelling, bleeding, inverted nipple, mass, nipple discharge, skin change or tenderness.      Left: Normal. No swelling, bleeding, inverted nipple, mass, nipple discharge, skin change or tenderness.   Abdominal:      Palpations: Abdomen is soft.   Genitourinary:     General: Normal vulva.      Exam position: Lithotomy position.      Pubic Area: No rash.       Labia:         Right: No rash,  tenderness, lesion or injury.         Left: No rash, tenderness, lesion or injury.       Urethra: No prolapse, urethral pain, urethral swelling or urethral lesion.      Vagina: Normal.      Cervix: Normal.      Uterus: Normal.       Adnexa: Right adnexa normal and left adnexa normal.      Rectum: Normal.   Musculoskeletal:         General: Normal range of motion.   Lymphadenopathy:      Upper Body:      Right upper body: No supraclavicular, axillary or pectoral adenopathy.      Left upper body: No supraclavicular, axillary or pectoral adenopathy.   Skin:     General: Skin is warm and dry.   Neurological:      General: No focal deficit present.      Mental Status: She is alert and oriented to person, place, and time. Mental status is at baseline.   Psychiatric:         Attention and Perception: Attention and perception normal.         Mood and Affect: Mood and affect normal.         Speech: Speech normal.         Behavior: Behavior normal. Behavior is cooperative.         Thought Content: Thought content normal.         Judgment: Judgment normal.       Assessment/Plan   Problem List Items Addressed This Visit             ICD-10-CM    Women's annual routine gynecological examination - Primary Z01.419     Other Visit Diagnoses         Codes    Screening mammogram for breast cancer     Z12.31    Relevant Orders    BI mammo bilateral screening tomosynthesis    Family history of breast cancer     Z80.3    Relevant Orders    BI mammo bilateral screening tomosynthesis        Pap due 2025  Mammogram ordered  If bleeding or pain worsens can call office to have pelvic ultrasound scheduled  Follow up 1 year for annual exam, sooner as needed if problems arise         STACY Guardado-CNP 02/26/25 1:22 PM

## 2025-03-05 PROCEDURE — 88305 TISSUE EXAM BY PATHOLOGIST: CPT | Performed by: DERMATOLOGY

## 2025-03-06 ENCOUNTER — LAB REQUISITION (OUTPATIENT)
Dept: DERMATOPATHOLOGY | Facility: CLINIC | Age: 40
End: 2025-03-06
Payer: COMMERCIAL

## 2025-03-06 DIAGNOSIS — D48.5 NEOPLASM OF UNCERTAIN BEHAVIOR OF SKIN: ICD-10-CM

## 2025-03-07 LAB
LABORATORY COMMENT REPORT: NORMAL
PATH REPORT.FINAL DX SPEC: NORMAL
PATH REPORT.GROSS SPEC: NORMAL
PATH REPORT.RELEVANT HX SPEC: NORMAL
PATH REPORT.TOTAL CANCER: NORMAL

## 2025-04-24 LAB
CHOLEST SERPL-MCNC: 263 MG/DL
CHOLEST/HDLC SERPL: 2.9 (CALC)
HDLC SERPL-MCNC: 92 MG/DL
LDLC SERPL CALC-MCNC: 155 MG/DL (CALC)
NONHDLC SERPL-MCNC: 171 MG/DL (CALC)
TRIGL SERPL-MCNC: 69 MG/DL

## 2025-04-29 ENCOUNTER — HOSPITAL ENCOUNTER (OUTPATIENT)
Dept: RADIOLOGY | Facility: CLINIC | Age: 40
Discharge: HOME | End: 2025-04-29
Payer: COMMERCIAL

## 2025-04-29 DIAGNOSIS — Z12.31 SCREENING MAMMOGRAM FOR BREAST CANCER: ICD-10-CM

## 2025-04-29 DIAGNOSIS — Z80.3 FAMILY HISTORY OF BREAST CANCER: ICD-10-CM

## 2025-04-29 PROCEDURE — 77067 SCR MAMMO BI INCL CAD: CPT | Performed by: RADIOLOGY

## 2025-04-29 PROCEDURE — 77067 SCR MAMMO BI INCL CAD: CPT

## 2025-04-29 PROCEDURE — 77063 BREAST TOMOSYNTHESIS BI: CPT | Performed by: RADIOLOGY

## 2025-05-07 ENCOUNTER — APPOINTMENT (OUTPATIENT)
Dept: PRIMARY CARE | Facility: CLINIC | Age: 40
End: 2025-05-07
Payer: COMMERCIAL

## 2025-05-07 VITALS
HEART RATE: 73 BPM | HEIGHT: 68 IN | SYSTOLIC BLOOD PRESSURE: 110 MMHG | BODY MASS INDEX: 23.55 KG/M2 | DIASTOLIC BLOOD PRESSURE: 72 MMHG | WEIGHT: 155.4 LBS | OXYGEN SATURATION: 98 %

## 2025-05-07 DIAGNOSIS — Z00.00 WELLNESS EXAMINATION: Primary | ICD-10-CM

## 2025-05-07 DIAGNOSIS — E78.5 HYPERLIPIDEMIA, UNSPECIFIED HYPERLIPIDEMIA TYPE: ICD-10-CM

## 2025-05-07 DIAGNOSIS — R53.83 OTHER FATIGUE: ICD-10-CM

## 2025-05-07 DIAGNOSIS — Q15.9 EYE ANOMALY: ICD-10-CM

## 2025-05-07 DIAGNOSIS — J30.9 ALLERGIC RHINITIS, UNSPECIFIED SEASONALITY, UNSPECIFIED TRIGGER: ICD-10-CM

## 2025-05-07 DIAGNOSIS — E55.9 VITAMIN D DEFICIENCY: ICD-10-CM

## 2025-05-07 DIAGNOSIS — J45.20 MILD INTERMITTENT ASTHMA, UNSPECIFIED WHETHER COMPLICATED (HHS-HCC): ICD-10-CM

## 2025-05-07 DIAGNOSIS — Z13.29 THYROID DISORDER SCREEN: ICD-10-CM

## 2025-05-07 PROCEDURE — 3008F BODY MASS INDEX DOCD: CPT | Performed by: NURSE PRACTITIONER

## 2025-05-07 PROCEDURE — 1036F TOBACCO NON-USER: CPT | Performed by: NURSE PRACTITIONER

## 2025-05-07 PROCEDURE — 99395 PREV VISIT EST AGE 18-39: CPT | Performed by: NURSE PRACTITIONER

## 2025-05-07 RX ORDER — LEVALBUTEROL TARTRATE 45 UG/1
2 AEROSOL, METERED ORAL EVERY 4 HOURS PRN
Qty: 15 G | Refills: 11 | Status: SHIPPED | OUTPATIENT
Start: 2025-05-07

## 2025-05-07 ASSESSMENT — ENCOUNTER SYMPTOMS
MUSCULOSKELETAL NEGATIVE: 1
ABDOMINAL DISTENTION: 0
LIGHT-HEADEDNESS: 0
GASTROINTESTINAL NEGATIVE: 1
EYE REDNESS: 0
FATIGUE: 1
EYE PAIN: 0
EYE DISCHARGE: 0
DIZZINESS: 0
HEADACHES: 0
SLEEP DISTURBANCE: 0
EYE ITCHING: 0
NERVOUS/ANXIOUS: 1
CHEST TIGHTNESS: 0
COUGH: 0
PHOTOPHOBIA: 0

## 2025-05-07 ASSESSMENT — PROMIS GLOBAL HEALTH SCALE
RATE_GENERAL_HEALTH: VERY GOOD
RATE_AVERAGE_FATIGUE: MODERATE
RATE_SOCIAL_SATISFACTION: VERY GOOD
RATE_MENTAL_HEALTH: GOOD
EMOTIONAL_PROBLEMS: OFTEN
CARRYOUT_PHYSICAL_ACTIVITIES: COMPLETELY
RATE_QUALITY_OF_LIFE: VERY GOOD
CARRYOUT_SOCIAL_ACTIVITIES: VERY GOOD
RATE_PHYSICAL_HEALTH: VERY GOOD
RATE_AVERAGE_PAIN: 0

## 2025-05-07 NOTE — PROGRESS NOTES
"Subjective   Patient ID: Sulma Bains is a 40 y.o. female who presents for Annual Exam (Yearly well exam/Lov 5/1/24/Labs 5/1/24/Repeat lipid 4/23/25).    HPI   Patient here for wellness exam. Last office visit on 05/01/2024  Current concerns:    1) right eye twitching  (eye exam February- everything was fine) then in March eye seemed larger, denies pain. Change in vision, increased tearing or itching.    Chronic Concerns: Allergic Rhinitis, Asthma, DM, Hyperlipidemia  Specialist:    - Dermatologist- skin checks ([facility])   - GYN [facility]   Labs-  05/01/2024  NON SMOKER  ETOH- 2-3 glasses of wine on weekends, Reports family hx of alcoholism so watches her intake.   Exercise- weights and walking dog, although less often -  running now once week 3 miles  Diet- low fat typically   No sweetened drinks  Coffee and water, (2-3 cups)    Review of Systems   Constitutional:  Positive for fatigue.   HENT: Negative.     Eyes:  Negative for photophobia, pain, discharge, redness, itching and visual disturbance.        Right Eye itself seems slightly more protruding and larger than left - pupils bilaterally same    Respiratory:  Negative for cough and chest tightness.         Heart racing at times,    Gastrointestinal: Negative.  Negative for abdominal distention.   Endocrine: Negative for cold intolerance.   Genitourinary: Negative.         LMP 04/12/205 regular  Heavy flow, cramping worse than previously    Musculoskeletal: Negative.    Skin: Negative.    Allergic/Immunologic: Positive for environmental allergies.   Neurological:  Negative for dizziness, light-headedness and headaches.   Psychiatric/Behavioral:  Negative for sleep disturbance. The patient is nervous/anxious (increased anxiety level).      Objective   /72 (BP Location: Right arm, Patient Position: Sitting, BP Cuff Size: Adult)   Pulse 73   Ht 1.727 m (5' 8\")   Wt 70.5 kg (155 lb 6.4 oz)   SpO2 98%   BMI 23.63 kg/m²   Weight in " May 2024 129.9 lbs     Physical Exam  Vitals reviewed.   Constitutional:       Appearance: She is normal weight.   HENT:      Right Ear: Ear canal normal.      Left Ear: Tympanic membrane and ear canal normal.      Nose: Nose normal.      Right Sinus: No maxillary sinus tenderness or frontal sinus tenderness.      Left Sinus: No maxillary sinus tenderness or frontal sinus tenderness.      Mouth/Throat:      Lips: Pink.      Mouth: Mucous membranes are moist.      Pharynx: Oropharynx is clear.   Eyes:      General: Lids are normal.         Right eye: No foreign body, discharge or hordeolum.         Left eye: No foreign body, discharge or hordeolum.      Extraocular Movements: Extraocular movements intact.      Conjunctiva/sclera: Conjunctivae normal.      Pupils: Pupils are equal, round, and reactive to light.   Neck:      Thyroid: No thyromegaly.      Vascular: No carotid bruit.   Cardiovascular:      Rate and Rhythm: Normal rate and regular rhythm.      Heart sounds: Normal heart sounds.   Pulmonary:      Breath sounds: Normal breath sounds.   Abdominal:      General: Bowel sounds are normal.      Palpations: Abdomen is soft.      Tenderness: There is no abdominal tenderness.   Musculoskeletal:      Cervical back: Neck supple.      Right lower leg: No edema.      Left lower leg: No edema.   Lymphadenopathy:      Cervical: No cervical adenopathy.   Skin:     General: Skin is warm.      Findings: No rash.   Neurological:      General: No focal deficit present.      Mental Status: She is alert.      Cranial Nerves: Cranial nerves 2-12 are intact.      Motor: Motor function is intact.      Coordination: Coordination is intact.      Gait: Gait is intact.      Deep Tendon Reflexes:      Reflex Scores:       Bicep reflexes are 2+ on the right side and 2+ on the left side.       Patellar reflexes are 2+ on the right side and 2+ on the left side.  Psychiatric:         Attention and Perception: Attention normal.          Behavior: Behavior normal. Behavior is cooperative.       Assessment/Plan   Labs- 05/01/2024 new orders in EMR  Influenza- annually  Prevnar 13/20- not indicated   Shingrix- not indicated   Colonoscopy- no family history- recommended screening age 45.   Cervical Cancer screen - GYN    Mammogram- 04/29/2025- (family hx breast cancer)   DEXA BONE Density - not indicated.   Had EKG and echocardiogram completed while in college (Had panic attack) found to be normal   Diagnoses and all orders for this visit:  Wellness examination  reviewed screenings, immunizations and vital signs. Reviewed appropriate health screenings/practices: including regular DDS & eye exams, wearing sunscreen,  appropriate balanced diet, such as the Mediterranean diet or low fat heart healthy diet,  exercise - moderate activity of at least 150 minutes a week, obtain and maintain normal Body Mass Index.      -     Albumin-Creatinine Ratio, Urine Random; Future  -     CBC and Auto Differential; Future  -     Comprehensive Metabolic Panel; Future  Eye Anomaly   Complete labs -> If all normal please keep me informed with any changes in sinus symptoms or eye concerns.   See eye doctor ir symptoms concerning eye start.   Mild intermittent asthma, unspecified whether complicated ([facility])  - refilled    levalbuterol (Xopenex) 45 mcg/actuation inhaler; Inhale 2 puffs every 4 hours if needed for wheezing or shortness of breath.  Hyperlipidemia, unspecified hyperlipidemia type  04/23/2025 total lipid 263, HDL 92, triglycerides 69, - reviewed with patient,  more elevated, she did stop using Niacin, plans to continue back on this supplement   05/2024 total lipid 249, HDL 86, triglycerides 57,   Allergic rhinitis, unspecified seasonality, unspecified trigger  Using otc fluticasone   Other fatigue  -     CBC and Auto Differential; Future  -     TSH with reflex to Free T4 if abnormal; Future  -     Iron and TIBC; Future  Thyroid disorder screen  -      TSH with reflex to Free T4 if abnormal; Future  Vitamin D deficiency  -     Vitamin D 25-Hydroxy,Total (for eval of Vitamin D levels); Future     PLAN: follow up yearly as wellness  Complete labs -> If all normal please keep me informed with any changes in sinus symptoms or eye concerns.   See eye doctor ir symptoms concerning eye start.

## 2025-05-07 NOTE — PATIENT INSTRUCTIONS
Complete labs -> If all normal please keep me informed with any changes in sinus symptoms or eye concerns.   See eye doctor ir symptoms concerning eye start.     Please start back niacin

## 2025-05-08 LAB
25(OH)D3+25(OH)D2 SERPL-MCNC: 32 NG/ML (ref 30–100)
ALBUMIN SERPL-MCNC: 4.5 G/DL (ref 3.6–5.1)
ALBUMIN/CREAT UR: NORMAL MG/G CREAT
ALP SERPL-CCNC: 46 U/L (ref 31–125)
ALT SERPL-CCNC: 10 U/L (ref 6–29)
ANION GAP SERPL CALCULATED.4IONS-SCNC: 10 MMOL/L (CALC) (ref 7–17)
AST SERPL-CCNC: 17 U/L (ref 10–30)
BASOPHILS # BLD AUTO: 41 CELLS/UL (ref 0–200)
BASOPHILS NFR BLD AUTO: 0.9 %
BILIRUB SERPL-MCNC: 0.5 MG/DL (ref 0.2–1.2)
BUN SERPL-MCNC: 15 MG/DL (ref 7–25)
CALCIUM SERPL-MCNC: 9.4 MG/DL (ref 8.6–10.2)
CHLORIDE SERPL-SCNC: 103 MMOL/L (ref 98–110)
CO2 SERPL-SCNC: 25 MMOL/L (ref 20–32)
CREAT SERPL-MCNC: 0.84 MG/DL (ref 0.5–0.97)
CREAT UR-MCNC: 39 MG/DL (ref 20–275)
EGFRCR SERPLBLD CKD-EPI 2021: 91 ML/MIN/1.73M2
EOSINOPHIL # BLD AUTO: 304 CELLS/UL (ref 15–500)
EOSINOPHIL NFR BLD AUTO: 6.6 %
ERYTHROCYTE [DISTWIDTH] IN BLOOD BY AUTOMATED COUNT: 12.9 % (ref 11–15)
GLUCOSE SERPL-MCNC: 93 MG/DL (ref 65–99)
HCT VFR BLD AUTO: 41 % (ref 35–45)
HGB BLD-MCNC: 13.7 G/DL (ref 11.7–15.5)
IRON SATN MFR SERPL: 34 % (CALC) (ref 16–45)
IRON SERPL-MCNC: 142 MCG/DL (ref 40–190)
LYMPHOCYTES # BLD AUTO: 1348 CELLS/UL (ref 850–3900)
LYMPHOCYTES NFR BLD AUTO: 29.3 %
MCH RBC QN AUTO: 30.3 PG (ref 27–33)
MCHC RBC AUTO-ENTMCNC: 33.4 G/DL (ref 32–36)
MCV RBC AUTO: 90.7 FL (ref 80–100)
MICROALBUMIN UR-MCNC: <0.2 MG/DL
MONOCYTES # BLD AUTO: 253 CELLS/UL (ref 200–950)
MONOCYTES NFR BLD AUTO: 5.5 %
NEUTROPHILS # BLD AUTO: 2654 CELLS/UL (ref 1500–7800)
NEUTROPHILS NFR BLD AUTO: 57.7 %
PLATELET # BLD AUTO: 247 THOUSAND/UL (ref 140–400)
PMV BLD REES-ECKER: 12.4 FL (ref 7.5–12.5)
POTASSIUM SERPL-SCNC: 5.1 MMOL/L (ref 3.5–5.3)
PROT SERPL-MCNC: 7.2 G/DL (ref 6.1–8.1)
RBC # BLD AUTO: 4.52 MILLION/UL (ref 3.8–5.1)
SODIUM SERPL-SCNC: 138 MMOL/L (ref 135–146)
TIBC SERPL-MCNC: 420 MCG/DL (CALC) (ref 250–450)
TSH SERPL-ACNC: 0.92 MIU/L
WBC # BLD AUTO: 4.6 THOUSAND/UL (ref 3.8–10.8)

## 2026-03-04 ENCOUNTER — APPOINTMENT (OUTPATIENT)
Dept: OBSTETRICS AND GYNECOLOGY | Facility: CLINIC | Age: 41
End: 2026-03-04
Payer: COMMERCIAL

## 2026-05-13 ENCOUNTER — APPOINTMENT (OUTPATIENT)
Dept: PRIMARY CARE | Facility: CLINIC | Age: 41
End: 2026-05-13
Payer: COMMERCIAL